# Patient Record
Sex: FEMALE | Race: WHITE
[De-identification: names, ages, dates, MRNs, and addresses within clinical notes are randomized per-mention and may not be internally consistent; named-entity substitution may affect disease eponyms.]

---

## 2017-06-28 ENCOUNTER — RECORD ABSTRACTING (OUTPATIENT)
Age: 32
End: 2017-06-28

## 2017-06-28 DIAGNOSIS — Z13.79 ENCOUNTER FOR OTHER SCREENING FOR GENETIC AND CHROMOSOMAL ANOMALIES: ICD-10-CM

## 2017-06-28 DIAGNOSIS — Z78.9 OTHER SPECIFIED HEALTH STATUS: ICD-10-CM

## 2017-06-28 DIAGNOSIS — Z36 ENCOUNTER FOR ANTENATAL SCREENING OF MOTHER: ICD-10-CM

## 2017-06-28 PROBLEM — Z00.00 ENCOUNTER FOR PREVENTIVE HEALTH EXAMINATION: Status: ACTIVE | Noted: 2017-06-28

## 2024-01-23 ENCOUNTER — APPOINTMENT (OUTPATIENT)
Dept: DERMATOLOGY | Facility: CLINIC | Age: 39
End: 2024-01-23
Payer: COMMERCIAL

## 2024-01-23 DIAGNOSIS — L65.0 TELOGEN EFFLUVIUM: ICD-10-CM

## 2024-01-23 DIAGNOSIS — L81.8 OTHER SPECIFIED DISORDERS OF PIGMENTATION: ICD-10-CM

## 2024-01-23 PROCEDURE — 99204 OFFICE O/P NEW MOD 45 MIN: CPT

## 2024-01-23 RX ORDER — MOMETASONE FUROATE 1 MG/G
0.1 CREAM TOPICAL
Qty: 1 | Refills: 2 | Status: ACTIVE | COMMUNITY
Start: 2024-01-23 | End: 1900-01-01

## 2024-01-23 RX ORDER — TACROLIMUS 1 MG/G
0.1 OINTMENT TOPICAL TWICE DAILY
Qty: 1 | Refills: 2 | Status: ACTIVE | COMMUNITY
Start: 2024-01-23 | End: 1900-01-01

## 2024-01-23 NOTE — ASSESSMENT
[FreeTextEntry1] : #vulvar pruritus and pain with subtle hypopigmentation in perineum suggestive of #LS  new diagnosis of uncertain prognosis  start tacrolimus BID to AA, SED including burning sensation and black box warning if no improvement will trial clobetasol 100% cotton underwear 100% cotton , fragrance free panty liners  switch to fragrance free body soap, avoid soap in the groin just cleanse with water can use plain vaseline or zinc oxide as needed for pruritus aside from tacrolimus RTC 1 mo  #AA, R frontotemporal scalp  new diagnosis of uncertain prognosis discussed nature and course of condition, including treatment options risks/benefits/alternatives involved pt defers ILK at this time, opts for topical steroids mometasone cream BID to AA, for 2 weeks on, 1 week off. repeat Reviewed SE of topical steroids including skin thinning and discoloration and discussed avoidance of prolonged use.  #TE discussed nature and course of condition, including treatment options risks/benefits/alternatives involved (minoxidil) removal/treatment deferred at this time pt has not had basic labs done in a while and prefers to have them checked at this time to ensure no underlying abnormality that may be contributing - cbc, cmp, ferritin, TFTs, vit D  # Post-inflammatory hypopigmentation, L forearm - Discussed natural history and slow time course for resolution  - Photoprotection reviewed including sun-protective behaviors, protective clothing, and the use of broad-spectrum SPF 30+ sunscreens was advised

## 2024-01-23 NOTE — HISTORY OF PRESENT ILLNESS
[FreeTextEntry1] : np pruritus [de-identified] : Referred by: Dr. Zepeda   Ms. MICHEL MAGUIRE  is a 38 year old F here for evaluation of below  #vulvar pruritus x 2-3 yrs got vaginal herpes this summer . also got a lesion biopsied with Dr. Zepeda which was reportedly benign. feels pruritus and pain (similar to the pain that results in a bruise but she doesn't have bruising) in the area causes occasional dyspareunia denies constipation/dysuria worsening  #alopecia on scalp - 1.5 mo, tried steroid cream a few days, noticed some hair regrowth # L forearm rash x 2 mo ago, resolved w/ topical steroid, now with light spot #hair shedding x 2.5 mo. 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 aside from the herpes infx, denies any preceding illnesses/surgeries/wt change   untreated   no personal or family h/o skin cancer

## 2024-01-23 NOTE — PHYSICAL EXAM
[Alert] : alert [Well Nourished] : well nourished [Oriented x 3] : ~L oriented x 3 [Conjunctiva Non-injected] : conjunctiva non-injected [No Visual Lymphadenopathy] : no visual  lymphadenopathy [No Clubbing] : no clubbing [No Edema] : no edema [No Bromhidrosis] : no bromhidrosis [No Chromhidrosis] : no chromhidrosis [FreeTextEntry3] : R frontotemporal scalp with round patch of alopecia with early regrowth L forearm with  hypopigmented patch negative hair pull test vulva, clitoris, and clitoral riggs with no rash/hypopigmentation/purpura no scarring/atrophy perineum with subtle stippled hypopigmentation

## 2024-01-23 NOTE — CONSULT LETTER
[Dear  ___] : Dear  [unfilled], [Consult Letter:] : I had the pleasure of evaluating your patient, [unfilled]. [Please see my note below.] : Please see my note below. [Consult Closing:] : Thank you very much for allowing me to participate in the care of this patient.  If you have any questions, please do not hesitate to contact me. [Sincerely,] : Sincerely, [FreeTextEntry3] : Madeline Washington MD Department of Dermatology [place] and Eunice VARGAS at [facility]

## 2024-02-29 ENCOUNTER — NON-APPOINTMENT (OUTPATIENT)
Age: 39
End: 2024-02-29

## 2024-02-29 LAB
25(OH)D3 SERPL-MCNC: 23.5 NG/ML
ALBUMIN SERPL ELPH-MCNC: 4.1 G/DL
ALP BLD-CCNC: 36 U/L
ALT SERPL-CCNC: 15 U/L
ANION GAP SERPL CALC-SCNC: 11 MMOL/L
AST SERPL-CCNC: 17 U/L
BASOPHILS # BLD AUTO: 0.04 K/UL
BASOPHILS NFR BLD AUTO: 1.2 %
BILIRUB SERPL-MCNC: 0.3 MG/DL
BUN SERPL-MCNC: 10 MG/DL
CALCIUM SERPL-MCNC: 8.8 MG/DL
CHLORIDE SERPL-SCNC: 105 MMOL/L
CO2 SERPL-SCNC: 23 MMOL/L
CREAT SERPL-MCNC: 0.76 MG/DL
EGFR: 104 ML/MIN/1.73M2
EOSINOPHIL # BLD AUTO: 0.19 K/UL
EOSINOPHIL NFR BLD AUTO: 5.7 %
FERRITIN SERPL-MCNC: 17 NG/ML
GLUCOSE SERPL-MCNC: 91 MG/DL
HCT VFR BLD CALC: 37.8 %
HGB BLD-MCNC: 12.2 G/DL
IMM GRANULOCYTES NFR BLD AUTO: 0.3 %
LYMPHOCYTES # BLD AUTO: 1.11 K/UL
LYMPHOCYTES NFR BLD AUTO: 33.1 %
MAN DIFF?: NORMAL
MCHC RBC-ENTMCNC: 28.1 PG
MCHC RBC-ENTMCNC: 32.3 GM/DL
MCV RBC AUTO: 87.1 FL
MONOCYTES # BLD AUTO: 0.23 K/UL
MONOCYTES NFR BLD AUTO: 6.9 %
NEUTROPHILS # BLD AUTO: 1.77 K/UL
NEUTROPHILS NFR BLD AUTO: 52.8 %
PLATELET # BLD AUTO: 179 K/UL
POTASSIUM SERPL-SCNC: 3.9 MMOL/L
PROT SERPL-MCNC: 6.8 G/DL
RBC # BLD: 4.34 M/UL
RBC # FLD: 13 %
SODIUM SERPL-SCNC: 139 MMOL/L
TSH SERPL-ACNC: 1.73 UIU/ML
WBC # FLD AUTO: 3.35 K/UL

## 2024-03-05 ENCOUNTER — APPOINTMENT (OUTPATIENT)
Dept: DERMATOLOGY | Facility: CLINIC | Age: 39
End: 2024-03-05
Payer: COMMERCIAL

## 2024-03-05 DIAGNOSIS — L98.9 DISORDER OF THE SKIN AND SUBCUTANEOUS TISSUE, UNSPECIFIED: ICD-10-CM

## 2024-03-05 PROCEDURE — 99214 OFFICE O/P EST MOD 30 MIN: CPT

## 2024-03-05 NOTE — ASSESSMENT
[FreeTextEntry1] : #Lichen sclerosus with pruritus and dyspareunia, progressing.  white sclerotic plaques of labia minora clearly appreciable on exam today discussed nature, chronicity and unpredictable course Therapeutic options and their risks and benefits; along with multiple diagnostic possibilities were discussed at length; risks and benefits of further study were discussed discussed this is a condition that requires long term skin-directed treatments reviewed risk of architectural distortion with disease progression discussed this condition requires regular monitoring given associated risk of SCC discussed association with other AI dz - pt already has alopecia areata as below discussed option of skin biopsy for diagnostic confirmation, especially in light of anticipated chronic skin directed therapies. patient defers biopsy at this time start clobetasol ointment to AA daily x 1 month, if sx improve decrease to QOD x 1 month, then BIW x 1 mo if continued improvement. if improved by f/u will plan to switch to mid potency TCS. Reviewed SE of topical steroids including skin thinning and discoloration.  100% cotton underwear 100% cotton, fragrance free panty liners   fragrance free body soap, avoid soap in the groin just cleanse with water  RTC 3 mo  #erosion, labia minora f/u PCR to r/o hsv if negative, will continue to treat as part of active LS which can manifest with painful fissures and erosions  #AA, R frontotemporal scalp, improving slowly but not at treatment goal reviewed nature, chronicity and unpredictable course reviewed r/b/a of various tx options pt would like to c/w TCS mometasone cream BID to AA, for 2 weeks on, 1 week off. repeat Reviewed SE of topical steroids including skin thinning and discoloration and discussed avoidance of prolonged use.  NOT DISCUSSED #TE discussed nature and course of condition, including treatment options risks/benefits/alternatives involved (minoxidil) removal/treatment deferred at this time cbc, cmp, vit D, tsh, ferritin checked - notable for vit D slightly low - recommended 800 IU daily suppl, ferritin 17 - rec slow FE daily and recheck in 3 mo.   # Post-inflammatory hypopigmentation, L forearm - Discussed natural history and slow time course for resolution  - Photoprotection reviewed including sun-protective behaviors, protective clothing, and the use of broad-spectrum SPF 30+ sunscreens was advised

## 2024-03-05 NOTE — CONSULT LETTER
[Dear  ___] : Dear  [unfilled], [Consult Letter:] : I had the pleasure of evaluating your patient, [unfilled]. [Please see my note below.] : Please see my note below. [Consult Closing:] : Thank you very much for allowing me to participate in the care of this patient.  If you have any questions, please do not hesitate to contact me. [Sincerely,] : Sincerely, [FreeTextEntry3] : Madeline Washington MD Department of Dermatology [place] and Eunice VARGAS at [facility]

## 2024-03-05 NOTE — HISTORY OF PRESENT ILLNESS
[FreeTextEntry1] : barak pruritus [de-identified] : Referred by: Dr. Zepeda   Ms. MICHEL MAGUIRE  is a healthy 38 year old F here for evaluation of below  #vulvar pruritus and dyspareunia - used tacrolimus for 2 weeks, stopped 1 week ago, no improvement hx: vulvar pruritus present 2-3 yrs reported h/o vaginal herpes this summer. also had a lesion biopsied with Dr. Zepeda which was reportedly benign. feels pruritus and pain (similar to the pain that results in a bruise but she doesn't have bruising) in the area causes occasional dyspareunia (started around dec 2023)  denies constipation endorses occasional mild dysuria, but no urinary urgency or frequency worsening  #AA on scalp - started around dec 2023, using mometasone cream since LV and noticed some hair regrowth. denies any new areas of involvement  derm hx: # L forearm rash x 2 mo ago, resolved w/ topical steroid, now with light spot #hair shedding x 2.5 mo. 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 aside from the herpes infx, denies any preceding illnesses/surgeries/wt change   untreated   no personal or family h/o skin cancer

## 2024-03-05 NOTE — PHYSICAL EXAM
[Alert] : alert [Oriented x 3] : ~L oriented x 3 [Well Nourished] : well nourished [Conjunctiva Non-injected] : conjunctiva non-injected [No Visual Lymphadenopathy] : no visual  lymphadenopathy [No Edema] : no edema [No Clubbing] : no clubbing [No Bromhidrosis] : no bromhidrosis [No Chromhidrosis] : no chromhidrosis [FreeTextEntry3] : R frontotemporal scalp with round patch of alopecia with exclamation point hairs, miniaturized hairs, and regrowth. no occipital LAD negative hair pull test b/l labia minora with white sclerotic shiny patches 1 erosion on L labia minora no purpura no scarring/atrophy. no labial/clitoral adhesions. no loss of normal labial architecture perineum with stippled hypopigmented patches

## 2024-06-18 ENCOUNTER — APPOINTMENT (OUTPATIENT)
Dept: DERMATOLOGY | Facility: CLINIC | Age: 39
End: 2024-06-18
Payer: COMMERCIAL

## 2024-06-18 DIAGNOSIS — N90.4 LEUKOPLAKIA OF VULVA: ICD-10-CM

## 2024-06-18 DIAGNOSIS — L29.9 PRURITUS, UNSPECIFIED: ICD-10-CM

## 2024-06-18 DIAGNOSIS — N94.10 UNSPECIFIED DYSPAREUNIA: ICD-10-CM

## 2024-06-18 DIAGNOSIS — L63.9 ALOPECIA AREATA, UNSPECIFIED: ICD-10-CM

## 2024-06-18 PROCEDURE — 99213 OFFICE O/P EST LOW 20 MIN: CPT

## 2024-06-18 RX ORDER — CLOBETASOL PROPIONATE 0.5 MG/G
0.05 OINTMENT TOPICAL
Qty: 1 | Refills: 6 | Status: ACTIVE | COMMUNITY
Start: 2024-03-05 | End: 1900-01-01

## 2024-06-18 NOTE — ASSESSMENT
[FreeTextEntry1] : #Lichen sclerosus with pruritus and dyspareunia, improving on clobetasol still with white sclerotic plaques of labia minora  discussed nature, chronicity and unpredictable course Therapeutic options and their risks and benefits; along with multiple diagnostic possibilities were discussed at length; risks and benefits of further study were discussed discussed this is a condition that requires long term skin-directed treatments reviewed risk of architectural distortion with disease progression discussed this condition requires regular monitoring given associated risk of SCC discussed association with other AI dz - pt already had alopecia areata as below (resolved) discussed option of skin biopsy for diagnostic confirmation, especially in light of anticipated chronic skin directed therapies. patient previously deferred biopsy. c/w clobetasol ointment to AA 2-3 x weekly, discussed importance of treating consistently in this manner to help reduce risk of SCC and architectural distortion Reviewed SE of topical steroids including skin thinning and discoloration.  100% cotton underwear 100% cotton, fragrance free panty liners   fragrance free body soap, avoid soap in the groin just cleanse with water  RTC 6 mo - if stable will dec to mid potency tcs  #AA, R frontotemporal scalp resolved s/p mometasone cream

## 2024-06-18 NOTE — PHYSICAL EXAM
[Alert] : alert [Oriented x 3] : ~L oriented x 3 [Well Nourished] : well nourished [Conjunctiva Non-injected] : conjunctiva non-injected [No Visual Lymphadenopathy] : no visual  lymphadenopathy [No Clubbing] : no clubbing [No Edema] : no edema [No Bromhidrosis] : no bromhidrosis [No Chromhidrosis] : no chromhidrosis [FreeTextEntry3] : R frontotemporal scalp with no alopecia  b/l labia minora with white sclerotic shiny patches no purpura no scarring/atrophy. no labial/clitoral adhesions. no scarring or loss of normal labial architecture perineum with stippled hypopigmented patches

## 2024-06-18 NOTE — HISTORY OF PRESENT ILLNESS
[FreeTextEntry1] : barak pruritus [de-identified] : Referred by: Dr. Zepeda   Ms. MICHEL MAGUIRE  is a healthy 38 year old F here for evaluation of below  #vulvar pruritus and dyspareunia - nearly resolved with use of clobetasol - initially used BID after LV for 2 weeks, then decreased frequency to BID twice weekly, if symptoms break through she applies the clobetasol which helps within 2 applications.  hx: vulvar pruritus present 2-3 yrs reported h/o vaginal herpes this summer. also had a lesion biopsied with Dr. Zepeda which was reportedly benign. feels pruritus and pain (similar to the pain that results in a bruise but she doesn't have bruising) in the area causes occasional dyspareunia (started around dec 2023)  denies constipation endorses occasional mild dysuria, but no urinary urgency or frequency worsening  #AA on scalp - started around dec 2023, used mometasone cream for 1-2 weeks and had subsequent full hair regrowth. denies any new areas of involvement  derm hx: # L forearm rash x 2 mo ago, resolved w/ topical steroid, now with light spot #hair shedding x 2.5 mo. 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 aside from the herpes infx, denies any preceding illnesses/surgeries/wt change   untreated   no personal or family h/o skin cancer

## 2024-06-18 NOTE — CONSULT LETTER
[Dear  ___] : Dear  [unfilled], [Consult Letter:] : I had the pleasure of evaluating your patient, [unfilled]. [Please see my note below.] : Please see my note below. [Consult Closing:] : Thank you very much for allowing me to participate in the care of this patient.  If you have any questions, please do not hesitate to contact me. [Sincerely,] : Sincerely, [FreeTextEntry3] : Madeline Washington MD Department of Dermatology [place] and Eunice VARGAS at [facility]

## 2024-10-01 ENCOUNTER — APPOINTMENT (OUTPATIENT)
Dept: DERMATOLOGY | Facility: CLINIC | Age: 39
End: 2024-10-01

## 2024-10-10 ENCOUNTER — APPOINTMENT (OUTPATIENT)
Dept: DERMATOLOGY | Facility: CLINIC | Age: 39
End: 2024-10-10
Payer: COMMERCIAL

## 2024-10-10 DIAGNOSIS — N90.4 LEUKOPLAKIA OF VULVA: ICD-10-CM

## 2024-10-10 PROCEDURE — 99214 OFFICE O/P EST MOD 30 MIN: CPT

## 2024-12-18 ENCOUNTER — APPOINTMENT (OUTPATIENT)
Dept: DERMATOLOGY | Facility: CLINIC | Age: 39
End: 2024-12-18

## 2025-04-08 ENCOUNTER — APPOINTMENT (OUTPATIENT)
Dept: DERMATOLOGY | Facility: CLINIC | Age: 40
End: 2025-04-08